# Patient Record
Sex: MALE | NOT HISPANIC OR LATINO | URBAN - METROPOLITAN AREA
[De-identification: names, ages, dates, MRNs, and addresses within clinical notes are randomized per-mention and may not be internally consistent; named-entity substitution may affect disease eponyms.]

---

## 2021-12-04 ENCOUNTER — EMERGENCY (EMERGENCY)
Facility: HOSPITAL | Age: 46
LOS: 1 days | Discharge: HOME | End: 2021-12-04
Attending: STUDENT IN AN ORGANIZED HEALTH CARE EDUCATION/TRAINING PROGRAM
Payer: MEDICAID

## 2021-12-04 VITALS
DIASTOLIC BLOOD PRESSURE: 76 MMHG | RESPIRATION RATE: 18 BRPM | TEMPERATURE: 98 F | WEIGHT: 263.89 LBS | SYSTOLIC BLOOD PRESSURE: 132 MMHG | HEART RATE: 58 BPM | HEIGHT: 71 IN | OXYGEN SATURATION: 96 %

## 2021-12-04 LAB
ALBUMIN SERPL ELPH-MCNC: 2.9 G/DL — LOW (ref 3.5–5)
ALP SERPL-CCNC: 180 U/L — HIGH (ref 40–120)
ALT FLD-CCNC: 64 U/L DA — HIGH (ref 10–60)
ANION GAP SERPL CALC-SCNC: 5 MMOL/L — SIGNIFICANT CHANGE UP (ref 5–17)
ANISOCYTOSIS BLD QL: SLIGHT — SIGNIFICANT CHANGE UP
AST SERPL-CCNC: 60 U/L — HIGH (ref 10–40)
BASOPHILS # BLD AUTO: 0.01 K/UL — SIGNIFICANT CHANGE UP (ref 0–0.2)
BASOPHILS NFR BLD AUTO: 0.3 % — SIGNIFICANT CHANGE UP (ref 0–2)
BILIRUB SERPL-MCNC: 0.7 MG/DL — SIGNIFICANT CHANGE UP (ref 0.2–1.2)
BUN SERPL-MCNC: 8 MG/DL — SIGNIFICANT CHANGE UP (ref 7–18)
CALCIUM SERPL-MCNC: 8.9 MG/DL — SIGNIFICANT CHANGE UP (ref 8.4–10.5)
CHLORIDE SERPL-SCNC: 107 MMOL/L — SIGNIFICANT CHANGE UP (ref 96–108)
CO2 SERPL-SCNC: 27 MMOL/L — SIGNIFICANT CHANGE UP (ref 22–31)
CREAT SERPL-MCNC: 0.63 MG/DL — SIGNIFICANT CHANGE UP (ref 0.5–1.3)
EOSINOPHIL # BLD AUTO: 0.08 K/UL — SIGNIFICANT CHANGE UP (ref 0–0.5)
EOSINOPHIL NFR BLD AUTO: 2 % — SIGNIFICANT CHANGE UP (ref 0–6)
GLUCOSE SERPL-MCNC: 96 MG/DL — SIGNIFICANT CHANGE UP (ref 70–99)
HCT VFR BLD CALC: 41 % — SIGNIFICANT CHANGE UP (ref 39–50)
HGB BLD-MCNC: 13.9 G/DL — SIGNIFICANT CHANGE UP (ref 13–17)
IMM GRANULOCYTES NFR BLD AUTO: 0.5 % — SIGNIFICANT CHANGE UP (ref 0–1.5)
LYMPHOCYTES # BLD AUTO: 1.53 K/UL — SIGNIFICANT CHANGE UP (ref 1–3.3)
LYMPHOCYTES # BLD AUTO: 38.6 % — SIGNIFICANT CHANGE UP (ref 13–44)
MACROCYTES BLD QL: SLIGHT — SIGNIFICANT CHANGE UP
MAGNESIUM SERPL-MCNC: 2 MG/DL — SIGNIFICANT CHANGE UP (ref 1.6–2.6)
MANUAL SMEAR VERIFICATION: SIGNIFICANT CHANGE UP
MCHC RBC-ENTMCNC: 28.2 PG — SIGNIFICANT CHANGE UP (ref 27–34)
MCHC RBC-ENTMCNC: 33.9 GM/DL — SIGNIFICANT CHANGE UP (ref 32–36)
MCV RBC AUTO: 83.2 FL — SIGNIFICANT CHANGE UP (ref 80–100)
MONOCYTES # BLD AUTO: 0.44 K/UL — SIGNIFICANT CHANGE UP (ref 0–0.9)
MONOCYTES NFR BLD AUTO: 11.1 % — SIGNIFICANT CHANGE UP (ref 2–14)
NEUTROPHILS # BLD AUTO: 1.88 K/UL — SIGNIFICANT CHANGE UP (ref 1.8–7.4)
NEUTROPHILS NFR BLD AUTO: 47.5 % — SIGNIFICANT CHANGE UP (ref 43–77)
NRBC # BLD: 0 /100 WBCS — SIGNIFICANT CHANGE UP (ref 0–0)
PLAT MORPH BLD: NORMAL — SIGNIFICANT CHANGE UP
PLATELET # BLD AUTO: 89 K/UL — LOW (ref 150–400)
PLATELET CLUMP BLD QL SMEAR: SLIGHT
POIKILOCYTOSIS BLD QL AUTO: SLIGHT — SIGNIFICANT CHANGE UP
POTASSIUM SERPL-MCNC: 3.8 MMOL/L — SIGNIFICANT CHANGE UP (ref 3.5–5.3)
POTASSIUM SERPL-SCNC: 3.8 MMOL/L — SIGNIFICANT CHANGE UP (ref 3.5–5.3)
PROT SERPL-MCNC: 7.4 G/DL — SIGNIFICANT CHANGE UP (ref 6–8.3)
RBC # BLD: 4.93 M/UL — SIGNIFICANT CHANGE UP (ref 4.2–5.8)
RBC # FLD: 12.4 % — SIGNIFICANT CHANGE UP (ref 10.3–14.5)
RBC BLD AUTO: ABNORMAL
SARS-COV-2 RNA SPEC QL NAA+PROBE: SIGNIFICANT CHANGE UP
SMUDGE CELLS # BLD: PRESENT — SIGNIFICANT CHANGE UP
SODIUM SERPL-SCNC: 139 MMOL/L — SIGNIFICANT CHANGE UP (ref 135–145)
TROPONIN I, HIGH SENSITIVITY RESULT: 11.3 NG/L — SIGNIFICANT CHANGE UP
WBC # BLD: 3.96 K/UL — SIGNIFICANT CHANGE UP (ref 3.8–10.5)
WBC # FLD AUTO: 3.96 K/UL — SIGNIFICANT CHANGE UP (ref 3.8–10.5)

## 2021-12-04 PROCEDURE — 84484 ASSAY OF TROPONIN QUANT: CPT

## 2021-12-04 PROCEDURE — 87635 SARS-COV-2 COVID-19 AMP PRB: CPT

## 2021-12-04 PROCEDURE — 85025 COMPLETE CBC W/AUTO DIFF WBC: CPT

## 2021-12-04 PROCEDURE — 71045 X-RAY EXAM CHEST 1 VIEW: CPT | Mod: 26

## 2021-12-04 PROCEDURE — 83735 ASSAY OF MAGNESIUM: CPT

## 2021-12-04 PROCEDURE — 99285 EMERGENCY DEPT VISIT HI MDM: CPT

## 2021-12-04 PROCEDURE — 71045 X-RAY EXAM CHEST 1 VIEW: CPT

## 2021-12-04 PROCEDURE — 93010 ELECTROCARDIOGRAM REPORT: CPT

## 2021-12-04 PROCEDURE — 93005 ELECTROCARDIOGRAM TRACING: CPT

## 2021-12-04 PROCEDURE — 82962 GLUCOSE BLOOD TEST: CPT

## 2021-12-04 PROCEDURE — 36415 COLL VENOUS BLD VENIPUNCTURE: CPT

## 2021-12-04 PROCEDURE — 80053 COMPREHEN METABOLIC PANEL: CPT

## 2021-12-04 PROCEDURE — 99285 EMERGENCY DEPT VISIT HI MDM: CPT | Mod: 25

## 2021-12-04 NOTE — ED PROVIDER NOTE - OBJECTIVE STATEMENT
46-year-old male hx of HTN, DM, currently in [facility] custody, presenting with dizziness and shortness of breath. Patient was seen here overnight, gave his brother's name instead of his own, and was discharged. He is returning now  with same symptoms. Pan positive ROS - claims he has chest pain, back pain, dizziness, shortness of breath, leg swelling, abdominal pain. Sleeping comfortably when providers are not in the room.

## 2021-12-04 NOTE — ED PROVIDER NOTE - PATIENT PORTAL LINK FT
You can access the FollowMyHealth Patient Portal offered by [facility] by registering at the following website: [email or web address]. By joining [employer]’s FollowMyHealth portal, you will also be able to view your health information using other applications (apps) compatible with our system.

## 2021-12-04 NOTE — ED PROVIDER NOTE - NS ED ATTENDING STATEMENT MOD
[Negative] : Allergic/Immunologic [Chest Pain] : no chest pain [Muscle Pain] : no muscle pain Attending Only

## 2021-12-04 NOTE — ED ADULT NURSE NOTE - OBJECTIVE STATEMENT
pt biba escorted by the  , pt is hand cuffed as per the pt " linda having Sob and linda having sharp pain in the chest " pt biba escorted by the  , pt is hand cuffed as per the pt " linda having Sob and linda having sharp pain in the chest " pt escorted by Officer with badge number [id number]

## 2021-12-04 NOTE — ED PROVIDER NOTE - CLINICAL SUMMARY MEDICAL DECISION MAKING FREE TEXT BOX
46-year-old male hx of HTN, DM, currently in [facility] custody, presenting with dizziness and shortness of breath, suspect malingering. Labs, ECG, CXR wnl. Will discharge.

## 2021-12-04 NOTE — ED PROVIDER NOTE - NSFOLLOWUPINSTRUCTIONS_ED_ALL_ED_FT
You were seen in the emergency department for: dizziness  Your results report is attached.    You may be contacted by the Emergency Department Referrals Coordinator to set up your follow-up appointment within 24-48 hours of your discharge, Monday to Friday. We recommend you follow up with: your primary care doctor.    Please return to the Emergency Department if you experience any of the following symptoms:   - Shortness of breath or trouble breathing  - Pressure, pain or tightness in the chest  - Face drooping, arm weakness or speech difficulty  - Persistence of severe vomiting  - Head injury or loss of consciousness  - Nonstop bleeding or an open wound    (1) Follow up with your primary care physician within the next 24-48 hours as discussed. In addition, we did not find evidence of a life threatening illness on your testing here today, but listed below are the specialists that will be necessary to see as an outpatient to continue the workup.  Please call the numbers listed below or [phone] to set up the necessary appointments.  (2) Take Tylenol (up to 1000mg or 1 g)  and/or Motrin (up to 600mg) up to every 6 hours as needed for pain.   (3) If you had an IV (intravenous) line placed, it was removed. Sometimes, after IV removal, that area can be tender for a few days; if it develops redness and swelling, those could be signs of infection; in which case, return to the Emergency Department for assessment.  (4) Please continue taking all of your home medications as directed.

## 2021-12-17 NOTE — ED ADULT NURSE NOTE - NSFALLRSKINDICATORS_ED_ALL_ED
Niacinamide Pregnancy And Lactation Text: These medications are considered safe during pregnancy. no

## 2022-01-26 ENCOUNTER — HOSPITAL ENCOUNTER (INPATIENT)
Dept: HOSPITAL 74 - YASAS | Age: 47
LOS: 5 days | Discharge: LEFT BEFORE DISCHARGE | DRG: 770 | End: 2022-01-31
Attending: ALLERGY & IMMUNOLOGY | Admitting: ALLERGY & IMMUNOLOGY
Payer: COMMERCIAL

## 2022-01-26 VITALS — BODY MASS INDEX: 34.2 KG/M2

## 2022-01-26 DIAGNOSIS — Z79.4: ICD-10-CM

## 2022-01-26 DIAGNOSIS — F32.A: ICD-10-CM

## 2022-01-26 DIAGNOSIS — E66.9: ICD-10-CM

## 2022-01-26 DIAGNOSIS — I10: ICD-10-CM

## 2022-01-26 DIAGNOSIS — E11.9: ICD-10-CM

## 2022-01-26 DIAGNOSIS — Z86.711: ICD-10-CM

## 2022-01-26 DIAGNOSIS — J45.909: ICD-10-CM

## 2022-01-26 DIAGNOSIS — Z62.810: ICD-10-CM

## 2022-01-26 DIAGNOSIS — F14.20: ICD-10-CM

## 2022-01-26 DIAGNOSIS — F17.210: ICD-10-CM

## 2022-01-26 DIAGNOSIS — M54.50: ICD-10-CM

## 2022-01-26 DIAGNOSIS — F11.20: Primary | ICD-10-CM

## 2022-01-26 DIAGNOSIS — F41.8: ICD-10-CM

## 2022-01-26 DIAGNOSIS — Z79.01: ICD-10-CM

## 2022-01-26 DIAGNOSIS — Z59.00: ICD-10-CM

## 2022-01-26 DIAGNOSIS — L85.3: ICD-10-CM

## 2022-01-26 PROCEDURE — U0005 INFEC AGEN DETEC AMPLI PROBE: HCPCS

## 2022-01-26 PROCEDURE — U0003 INFECTIOUS AGENT DETECTION BY NUCLEIC ACID (DNA OR RNA); SEVERE ACUTE RESPIRATORY SYNDROME CORONAVIRUS 2 (SARS-COV-2) (CORONAVIRUS DISEASE [COVID-19]), AMPLIFIED PROBE TECHNIQUE, MAKING USE OF HIGH THROUGHPUT TECHNOLOGIES AS DESCRIBED BY CMS-2020-01-R: HCPCS

## 2022-01-26 PROCEDURE — C9803 HOPD COVID-19 SPEC COLLECT: HCPCS

## 2022-01-26 PROCEDURE — HZ42ZZZ GROUP COUNSELING FOR SUBSTANCE ABUSE TREATMENT, COGNITIVE-BEHAVIORAL: ICD-10-PCS | Performed by: ALLERGY & IMMUNOLOGY

## 2022-01-26 RX ADMIN — Medication SCH MG: at 21:02

## 2022-01-26 RX ADMIN — HYDROXYZINE PAMOATE SCH: 25 CAPSULE ORAL at 20:58

## 2022-01-26 RX ADMIN — HYDROXYZINE PAMOATE SCH MG: 25 CAPSULE ORAL at 21:02

## 2022-01-26 SDOH — ECONOMIC STABILITY - HOUSING INSECURITY: HOMELESSNESS UNSPECIFIED: Z59.00

## 2022-01-27 LAB
APPEARANCE UR: CLEAR
BILIRUB UR STRIP.AUTO-MCNC: NEGATIVE MG/DL
COLOR UR: YELLOW
KETONES UR QL STRIP: NEGATIVE
LEUKOCYTE ESTERASE UR QL STRIP.AUTO: NEGATIVE
NITRITE UR QL STRIP: NEGATIVE
PH UR: 6 [PH] (ref 5–8)
PROT UR QL STRIP: NEGATIVE
PROT UR QL STRIP: NEGATIVE
SP GR UR: 1.01 (ref 1.01–1.03)
UROBILINOGEN UR STRIP-MCNC: 1 MG/DL (ref 0.2–1)

## 2022-01-27 RX ADMIN — APIXABAN SCH MG: 2.5 TABLET, FILM COATED ORAL at 21:08

## 2022-01-27 RX ADMIN — HYDROXYZINE PAMOATE SCH MG: 25 CAPSULE ORAL at 10:13

## 2022-01-27 RX ADMIN — HYDROXYZINE PAMOATE SCH MG: 25 CAPSULE ORAL at 21:07

## 2022-01-27 RX ADMIN — ATORVASTATIN CALCIUM SCH MG: 10 TABLET, FILM COATED ORAL at 21:08

## 2022-01-27 RX ADMIN — INSULIN ASPART SCH: 100 INJECTION, SOLUTION INTRAVENOUS; SUBCUTANEOUS at 11:33

## 2022-01-27 RX ADMIN — HYDROXYZINE PAMOATE SCH MG: 25 CAPSULE ORAL at 06:37

## 2022-01-27 RX ADMIN — GABAPENTIN SCH MG: 400 CAPSULE ORAL at 21:07

## 2022-01-27 RX ADMIN — Medication SCH MG: at 21:07

## 2022-01-27 RX ADMIN — INSULIN DETEMIR SCH UNITS: 100 INJECTION, SOLUTION SUBCUTANEOUS at 21:08

## 2022-01-27 RX ADMIN — Medication SCH TAB: at 10:13

## 2022-01-27 RX ADMIN — APIXABAN SCH MG: 2.5 TABLET, FILM COATED ORAL at 11:31

## 2022-01-27 RX ADMIN — NICOTINE SCH MG: 7 PATCH TRANSDERMAL at 10:13

## 2022-01-27 RX ADMIN — INSULIN ASPART SCH: 100 INJECTION, SOLUTION INTRAVENOUS; SUBCUTANEOUS at 21:09

## 2022-01-27 RX ADMIN — HYDROXYZINE PAMOATE SCH MG: 25 CAPSULE ORAL at 17:17

## 2022-01-27 RX ADMIN — HYDROXYZINE PAMOATE SCH: 25 CAPSULE ORAL at 14:12

## 2022-01-27 RX ADMIN — INSULIN ASPART SCH: 100 INJECTION, SOLUTION INTRAVENOUS; SUBCUTANEOUS at 17:18

## 2022-01-27 RX ADMIN — GABAPENTIN SCH MG: 400 CAPSULE ORAL at 15:32

## 2022-01-27 RX ADMIN — LISINOPRIL SCH MG: 20 TABLET ORAL at 10:14

## 2022-01-28 LAB
ANION GAP SERPL CALC-SCNC: 3 MMOL/L (ref 8–16)
BUN SERPL-MCNC: 8.5 MG/DL (ref 7–18)
CALCIUM SERPL-MCNC: 9 MG/DL (ref 8.5–10.1)
CHLORIDE SERPL-SCNC: 105 MMOL/L (ref 98–107)
CO2 SERPL-SCNC: 32 MMOL/L (ref 21–32)
CREAT SERPL-MCNC: 0.7 MG/DL (ref 0.55–1.3)
GLUCOSE SERPL-MCNC: 100 MG/DL (ref 74–106)
SODIUM SERPL-SCNC: 140 MMOL/L (ref 136–145)

## 2022-01-28 RX ADMIN — APIXABAN SCH MG: 2.5 TABLET, FILM COATED ORAL at 21:08

## 2022-01-28 RX ADMIN — INSULIN ASPART SCH: 100 INJECTION, SOLUTION INTRAVENOUS; SUBCUTANEOUS at 06:27

## 2022-01-28 RX ADMIN — GABAPENTIN SCH MG: 400 CAPSULE ORAL at 21:07

## 2022-01-28 RX ADMIN — HYDROXYZINE PAMOATE SCH MG: 25 CAPSULE ORAL at 09:55

## 2022-01-28 RX ADMIN — INSULIN ASPART SCH: 100 INJECTION, SOLUTION INTRAVENOUS; SUBCUTANEOUS at 11:48

## 2022-01-28 RX ADMIN — LIDOCAINE SCH PATCH: 50 PATCH TOPICAL at 11:48

## 2022-01-28 RX ADMIN — HYDROXYZINE PAMOATE SCH: 25 CAPSULE ORAL at 18:50

## 2022-01-28 RX ADMIN — HYDROXYZINE PAMOATE SCH MG: 25 CAPSULE ORAL at 21:07

## 2022-01-28 RX ADMIN — NICOTINE SCH MG: 7 PATCH TRANSDERMAL at 09:55

## 2022-01-28 RX ADMIN — Medication SCH TAB: at 09:55

## 2022-01-28 RX ADMIN — Medication SCH MG: at 21:07

## 2022-01-28 RX ADMIN — INSULIN ASPART SCH: 100 INJECTION, SOLUTION INTRAVENOUS; SUBCUTANEOUS at 21:17

## 2022-01-28 RX ADMIN — HYDROXYZINE PAMOATE SCH MG: 25 CAPSULE ORAL at 06:24

## 2022-01-28 RX ADMIN — APIXABAN SCH MG: 2.5 TABLET, FILM COATED ORAL at 09:55

## 2022-01-28 RX ADMIN — GABAPENTIN SCH MG: 400 CAPSULE ORAL at 13:50

## 2022-01-28 RX ADMIN — Medication SCH EACH: at 21:09

## 2022-01-28 RX ADMIN — HYDROXYZINE PAMOATE SCH MG: 25 CAPSULE ORAL at 13:50

## 2022-01-28 RX ADMIN — INSULIN ASPART SCH: 100 INJECTION, SOLUTION INTRAVENOUS; SUBCUTANEOUS at 16:28

## 2022-01-28 RX ADMIN — ATORVASTATIN CALCIUM SCH MG: 10 TABLET, FILM COATED ORAL at 21:07

## 2022-01-28 RX ADMIN — LISINOPRIL SCH MG: 20 TABLET ORAL at 09:55

## 2022-01-28 RX ADMIN — GABAPENTIN SCH MG: 400 CAPSULE ORAL at 06:24

## 2022-01-28 RX ADMIN — INSULIN DETEMIR SCH UNITS: 100 INJECTION, SOLUTION SUBCUTANEOUS at 21:08

## 2022-01-29 RX ADMIN — INSULIN ASPART SCH: 100 INJECTION, SOLUTION INTRAVENOUS; SUBCUTANEOUS at 21:49

## 2022-01-29 RX ADMIN — HYDROXYZINE PAMOATE SCH: 25 CAPSULE ORAL at 21:50

## 2022-01-29 RX ADMIN — INSULIN DETEMIR SCH UNITS: 100 INJECTION, SOLUTION SUBCUTANEOUS at 21:34

## 2022-01-29 RX ADMIN — GABAPENTIN SCH MG: 400 CAPSULE ORAL at 21:28

## 2022-01-29 RX ADMIN — ATORVASTATIN CALCIUM SCH MG: 10 TABLET, FILM COATED ORAL at 21:27

## 2022-01-29 RX ADMIN — LISINOPRIL SCH MG: 20 TABLET ORAL at 09:24

## 2022-01-29 RX ADMIN — GABAPENTIN SCH MG: 400 CAPSULE ORAL at 13:12

## 2022-01-29 RX ADMIN — INSULIN ASPART SCH: 100 INJECTION, SOLUTION INTRAVENOUS; SUBCUTANEOUS at 06:22

## 2022-01-29 RX ADMIN — APIXABAN SCH MG: 2.5 TABLET, FILM COATED ORAL at 21:28

## 2022-01-29 RX ADMIN — Medication SCH MG: at 21:28

## 2022-01-29 RX ADMIN — HYDROXYZINE PAMOATE SCH MG: 25 CAPSULE ORAL at 13:12

## 2022-01-29 RX ADMIN — HYDROXYZINE PAMOATE SCH MG: 25 CAPSULE ORAL at 09:24

## 2022-01-29 RX ADMIN — INSULIN ASPART SCH: 100 INJECTION, SOLUTION INTRAVENOUS; SUBCUTANEOUS at 17:08

## 2022-01-29 RX ADMIN — Medication SCH TAB: at 09:24

## 2022-01-29 RX ADMIN — Medication SCH MG: at 21:27

## 2022-01-29 RX ADMIN — GABAPENTIN SCH MG: 400 CAPSULE ORAL at 06:18

## 2022-01-29 RX ADMIN — INSULIN ASPART SCH: 100 INJECTION, SOLUTION INTRAVENOUS; SUBCUTANEOUS at 11:32

## 2022-01-29 RX ADMIN — HYDROXYZINE PAMOATE SCH MG: 25 CAPSULE ORAL at 17:08

## 2022-01-29 RX ADMIN — APIXABAN SCH MG: 2.5 TABLET, FILM COATED ORAL at 09:24

## 2022-01-29 RX ADMIN — Medication SCH EACH: at 21:28

## 2022-01-29 RX ADMIN — HYDROXYZINE PAMOATE SCH MG: 25 CAPSULE ORAL at 06:18

## 2022-01-29 RX ADMIN — NICOTINE SCH MG: 7 PATCH TRANSDERMAL at 09:24

## 2022-01-29 RX ADMIN — LIDOCAINE SCH PATCH: 50 PATCH TOPICAL at 09:24

## 2022-01-30 RX ADMIN — HYDROXYZINE PAMOATE SCH MG: 25 CAPSULE ORAL at 21:01

## 2022-01-30 RX ADMIN — HYDROXYZINE PAMOATE SCH: 25 CAPSULE ORAL at 14:18

## 2022-01-30 RX ADMIN — Medication SCH EACH: at 21:10

## 2022-01-30 RX ADMIN — Medication SCH MG: at 21:03

## 2022-01-30 RX ADMIN — ATORVASTATIN CALCIUM SCH MG: 10 TABLET, FILM COATED ORAL at 21:03

## 2022-01-30 RX ADMIN — LISINOPRIL SCH: 20 TABLET ORAL at 10:12

## 2022-01-30 RX ADMIN — GABAPENTIN SCH MG: 400 CAPSULE ORAL at 21:01

## 2022-01-30 RX ADMIN — INSULIN ASPART SCH: 100 INJECTION, SOLUTION INTRAVENOUS; SUBCUTANEOUS at 11:27

## 2022-01-30 RX ADMIN — HYDROXYZINE PAMOATE SCH MG: 25 CAPSULE ORAL at 17:09

## 2022-01-30 RX ADMIN — INSULIN ASPART SCH: 100 INJECTION, SOLUTION INTRAVENOUS; SUBCUTANEOUS at 21:09

## 2022-01-30 RX ADMIN — HYDROXYZINE PAMOATE SCH MG: 25 CAPSULE ORAL at 06:21

## 2022-01-30 RX ADMIN — LIDOCAINE SCH PATCH: 50 PATCH TOPICAL at 10:12

## 2022-01-30 RX ADMIN — APIXABAN SCH MG: 2.5 TABLET, FILM COATED ORAL at 10:13

## 2022-01-30 RX ADMIN — NICOTINE SCH MG: 7 PATCH TRANSDERMAL at 10:12

## 2022-01-30 RX ADMIN — INSULIN ASPART SCH: 100 INJECTION, SOLUTION INTRAVENOUS; SUBCUTANEOUS at 17:09

## 2022-01-30 RX ADMIN — GABAPENTIN SCH MG: 400 CAPSULE ORAL at 06:21

## 2022-01-30 RX ADMIN — INSULIN ASPART SCH: 100 INJECTION, SOLUTION INTRAVENOUS; SUBCUTANEOUS at 08:35

## 2022-01-30 RX ADMIN — HYDROXYZINE PAMOATE SCH MG: 25 CAPSULE ORAL at 10:12

## 2022-01-30 RX ADMIN — INSULIN DETEMIR SCH UNITS: 100 INJECTION, SOLUTION SUBCUTANEOUS at 21:09

## 2022-01-30 RX ADMIN — Medication SCH TAB: at 10:13

## 2022-01-30 RX ADMIN — GABAPENTIN SCH MG: 400 CAPSULE ORAL at 14:17

## 2022-01-30 RX ADMIN — APIXABAN SCH: 2.5 TABLET, FILM COATED ORAL at 21:47

## 2022-01-31 VITALS — HEART RATE: 68 BPM | SYSTOLIC BLOOD PRESSURE: 112 MMHG | DIASTOLIC BLOOD PRESSURE: 69 MMHG

## 2022-01-31 VITALS — TEMPERATURE: 97.3 F

## 2022-01-31 RX ADMIN — INSULIN ASPART SCH: 100 INJECTION, SOLUTION INTRAVENOUS; SUBCUTANEOUS at 11:57

## 2022-01-31 RX ADMIN — APIXABAN SCH MG: 2.5 TABLET, FILM COATED ORAL at 10:04

## 2022-01-31 RX ADMIN — HYDROXYZINE PAMOATE SCH MG: 25 CAPSULE ORAL at 06:23

## 2022-01-31 RX ADMIN — INSULIN ASPART SCH: 100 INJECTION, SOLUTION INTRAVENOUS; SUBCUTANEOUS at 17:05

## 2022-01-31 RX ADMIN — GABAPENTIN SCH MG: 400 CAPSULE ORAL at 06:23

## 2022-01-31 RX ADMIN — HYDROXYZINE PAMOATE SCH MG: 25 CAPSULE ORAL at 17:03

## 2022-01-31 RX ADMIN — GABAPENTIN SCH MG: 400 CAPSULE ORAL at 14:25

## 2022-01-31 RX ADMIN — HYDROXYZINE PAMOATE SCH MG: 25 CAPSULE ORAL at 10:03

## 2022-01-31 RX ADMIN — Medication SCH TAB: at 11:57

## 2022-01-31 RX ADMIN — HYDROXYZINE PAMOATE SCH MG: 25 CAPSULE ORAL at 14:25

## 2022-01-31 RX ADMIN — INSULIN ASPART SCH: 100 INJECTION, SOLUTION INTRAVENOUS; SUBCUTANEOUS at 06:25

## 2022-01-31 RX ADMIN — NICOTINE SCH MG: 7 PATCH TRANSDERMAL at 10:04

## 2022-01-31 RX ADMIN — NICOTINE PRN MG: 4 INHALANT RESPIRATORY (INHALATION) at 17:04

## 2022-01-31 RX ADMIN — LIDOCAINE SCH PATCH: 50 PATCH TOPICAL at 10:03

## 2022-01-31 RX ADMIN — LISINOPRIL SCH MG: 20 TABLET ORAL at 10:03

## 2022-01-31 RX ADMIN — NICOTINE PRN MG: 4 INHALANT RESPIRATORY (INHALATION) at 12:12
